# Patient Record
(demographics unavailable — no encounter records)

---

## 2025-02-26 NOTE — END OF VISIT
[] : Resident [FreeTextEntry3] : Here to re-establish care after 4 years. His ACT team manages his medications.  theoretically confirmed meds, but lithium 20 is an unusual dose. On some amount of lithium/olanzapine bid and gets injectable risperidone. Active tobacco user and still smokes 1 ppd x 31 p-y. Will get lung CT at age 50. Bronchial breath sounds and occasional cough, will refer for spirometry. Derm or basic topical if acne worsening. flu today, hep B series later. FIT next year.

## 2025-02-26 NOTE — HEALTH RISK ASSESSMENT
[Little interest or pleasure doing things] : 1) Little interest or pleasure doing things [Feeling down, depressed, or hopeless] : 2) Feeling down, depressed, or hopeless [0] : 2) Feeling down, depressed, or hopeless: Not at all (0) [No] : In the past 12 months have you used drugs other than those required for medical reasons? No [PHQ-2 Negative - No further assessment needed] : PHQ-2 Negative - No further assessment needed [Current] : Current [20 or more] : 20 or more [Unemployed] : unemployed [Single] : single [Fair] :  ~his/her~ mood as fair [Change in mental status noted] : Change in mental status noted [Behavior] : difficulty with behavior [Learning/Retaining New Information] : difficulty learning/retaining new information [Handling Complex Tasks] : difficulty handling complex tasks [Reasoning] : difficulty with reasoning [Behavioral] : behavioral [Health Literacy] : health literacy [Alone] : lives alone [Feels Safe at Home] : Feels safe at home [Reports changes in dental health] : Reports changes in dental health [FreeTextEntry1] : dying from smoking. [de-identified] : but previous use in chart [[id number]] : 0 [Sexually Active] : not sexually active [High Risk Behavior] : no high risk behavior

## 2025-02-26 NOTE — PHYSICAL EXAM
[Well Developed] : well developed [Normal Outer Ear/Nose] : the outer ears and nose were normal in appearance [Normal TMs] : both tympanic membranes were normal [Normal Nasal Mucosa] : the nasal mucosa was normal [No Respiratory Distress] : no respiratory distress  [No Accessory Muscle Use] : no accessory muscle use [Acne] : acne [Normal] : normal gait, coordination grossly intact, no focal deficits and deep tendon reflexes were 2+ and symmetric [Person] : oriented to person [Short Term Intact] : short term memory intact [Span Intact] : the attention span was normal [Naming Objects] : no difficulty naming common objects [Flat] : flat [Impaired Insight] : impaired insight [Rate Slowed] : slowed [Slowed Rate Of Thought] : slowed rate of thought [Tangentiality] : tangentiality [Delusions] : exhibited no delusions [Hallucinations] : exhibited no hallucinations [Obsessions] : denied obsessions [Preoccupation with Violence] : denied preoccupation with violent thoughts [Suicidal Ideation] : denied suicidal ideation [Suicidal Intent] : denied suicidal intent [Suicidal Plan] : denied suicidal plans [Homicidal Ideation] : denied homicidal ideation [Homicidal Intent] : denied homicidal intention [Homicidal Plan] : denied homicidal plans [de-identified] : some yellowing of molars, no signs of swelling [de-identified] : bronchial breath sounds on R middle and lower lobes

## 2025-02-26 NOTE — REVIEW OF SYSTEMS
[Cough] : cough [Negative] : Musculoskeletal [Headache] : no headache [Suicidal] : not suicidal [Insomnia] : no insomnia [Anxiety] : no anxiety [Depression] : no depression [FreeTextEntry6] : daily cough [FreeTextEntry7] : GERD per Noe, CM [de-identified] : acne on face and upper back

## 2025-02-26 NOTE — HEALTH RISK ASSESSMENT
[Little interest or pleasure doing things] : 1) Little interest or pleasure doing things [Feeling down, depressed, or hopeless] : 2) Feeling down, depressed, or hopeless [0] : 2) Feeling down, depressed, or hopeless: Not at all (0) [No] : In the past 12 months have you used drugs other than those required for medical reasons? No [PHQ-2 Negative - No further assessment needed] : PHQ-2 Negative - No further assessment needed [Current] : Current [20 or more] : 20 or more [Unemployed] : unemployed [Single] : single [Fair] :  ~his/her~ mood as fair [Change in mental status noted] : Change in mental status noted [Behavior] : difficulty with behavior [Learning/Retaining New Information] : difficulty learning/retaining new information [Handling Complex Tasks] : difficulty handling complex tasks [Reasoning] : difficulty with reasoning [Behavioral] : behavioral [Health Literacy] : health literacy [Alone] : lives alone [Feels Safe at Home] : Feels safe at home [Reports changes in dental health] : Reports changes in dental health [FreeTextEntry1] : dying from smoking. [de-identified] : but previous use in chart [[id number]] : 0 [Sexually Active] : not sexually active [High Risk Behavior] : no high risk behavior

## 2025-02-26 NOTE — CURRENT MEDS
[Takes medication as prescribed] : takes [FreeTextEntry1] : medications given to him at residence, takes them daily.

## 2025-02-26 NOTE — HISTORY OF PRESENT ILLNESS
[FreeTextEntry1] : here for CPE [de-identified] : Patient Josafat Garibay is a 43yo M w/ PMH schizophrenia, depression, anxiety, active smoker, prior h/o polysubstance abuse (alcohol, marijuana), here for CPE, previously seen by our office in .   Patient is doing well, he denies any recent hospitalizations. He initially thought he needed a referral from our office for a dentist appointment but was directed to book this via the care coordination he receives from his ACT team. He was previously listed on our records to be on lithium 20mg BID and olanzapine 20mg BID and long-acting Risperidone 150mg qmonthly (previously Haldol 10mg on our records). A lady by the name of Shaila (?) gives him his medications qAM and QHS and checks in on him. He lives in the [facility] ([address] [place] [phone]), and is followed by the  and ACT team.   Patient otherwise does well, endorses ongoing smoking, since he was 14 yo, 1 pack a day, of cigarettes (31 pack year total). Has a daytime cough that is not bothersome to him, but had no recent URTI infections, no difficulties breathing, no history of hospitalizations for COPDe etc. He has previously tried to quit in the past, via nicotine gum, he went to a smoking cessation program in the [place], and has tried quitting cold, but these have not worked for him so he is uninterested in quitting. He denies any constiutional sx, has good appetite. Denies any other substances like alcohol, marijuana, although he used to do this. Has ongoing facial and upper back acne that is sometimes bothersome to him. No issues with mobility. No issues with bowel and bladder, has no LUTS and regular bowel movements qdaily. Past surgical history of a repair ?biceps tendon on the medial aspect of his left arm, medial to the olecranon which he had at [facility], leading to flexion of his PIP on the L hand, but this is not bothersome to him, does not impair with daily activities and no sensation differences. Denies vision changes, headache, other FNDs.   Called [facility] residence,  is Noe, number [phone] (number updated in chart). Takes Lithium 20mg BID, risperidone 150mg IM once a month, olanzapine 20mg BID, trazadone 50mg at bedtime, pantoprazole 40mg daily, a multivitamin.  family hx:  father had 2 MI,  of second MI at 65yo, mom is still alive lives by herself in the [place], had an MI, mom is 71yo. His maternal uncle had mental illness, paternal uncle had "heart issues" He denies any siblings, does not have any children.   social hx: patient unemployed, lives in supportive housing residence ([facility] [address]) ,and has a  and ACT team that manages his psychiatric medications, is unable to list his psychiatrist, he thought his CM was someone called shaila but this was later verified by the residence as someone named Noe. He denies any partners, has no recent sexual history, denies travel. He does visit his mother in her apartment and has some friends in the residence.    allergies - had full-body hives when he took penicilin as  a child, 6 yo.

## 2025-02-26 NOTE — HISTORY OF PRESENT ILLNESS
[FreeTextEntry1] : here for CPE [de-identified] : Patient Josafat Garibay is a 45yo M w/ PMH schizophrenia, depression, anxiety, active smoker, prior h/o polysubstance abuse (alcohol, marijuana), here for CPE, previously seen by our office in .   Patient is doing well, he denies any recent hospitalizations. He initially thought he needed a referral from our office for a dentist appointment but was directed to book this via the care coordination he receives from his ACT team. He was previously listed on our records to be on lithium 20mg BID and olanzapine 20mg BID and long-acting Risperidone 150mg qmonthly (previously Haldol 10mg on our records). A lady by the name of Shaila (?) gives him his medications qAM and QHS and checks in on him. He lives in the [facility] ([address] [place] [phone]), and is followed by the  and ACT team.   Patient otherwise does well, endorses ongoing smoking, since he was 14 yo, 1 pack a day, of cigarettes (31 pack year total). Has a daytime cough that is not bothersome to him, but had no recent URTI infections, no difficulties breathing, no history of hospitalizations for COPDe etc. He has previously tried to quit in the past, via nicotine gum, he went to a smoking cessation program in the [place], and has tried quitting cold, but these have not worked for him so he is uninterested in quitting. He denies any constiutional sx, has good appetite. Denies any other substances like alcohol, marijuana, although he used to do this. Has ongoing facial and upper back acne that is sometimes bothersome to him. No issues with mobility. No issues with bowel and bladder, has no LUTS and regular bowel movements qdaily. Past surgical history of a repair ?biceps tendon on the medial aspect of his left arm, medial to the olecranon which he had at [facility], leading to flexion of his PIP on the L hand, but this is not bothersome to him, does not impair with daily activities and no sensation differences. Denies vision changes, headache, other FNDs.   Called [facility] residence,  is Noe, number [phone] (number updated in chart). Takes Lithium 20mg BID, risperidone 150mg IM once a month, olanzapine 20mg BID, trazadone 50mg at bedtime, pantoprazole 40mg daily, a multivitamin.  family hx:  father had 2 MI,  of second MI at 67yo, mom is still alive lives by herself in the [place], had an MI, mom is 71yo. His maternal uncle had mental illness, paternal uncle had "heart issues" He denies any siblings, does not have any children.   social hx: patient unemployed, lives in supportive housing residence ([facility] [address]) ,and has a  and ACT team that manages his psychiatric medications, is unable to list his psychiatrist, he thought his CM was someone called shaila but this was later verified by the residence as someone named Noe. He denies any partners, has no recent sexual history, denies travel. He does visit his mother in her apartment and has some friends in the residence.    allergies - had full-body hives when he took penicilin as  a child, 8 yo.

## 2025-02-26 NOTE — REVIEW OF SYSTEMS
[Cough] : cough [Negative] : Musculoskeletal [Headache] : no headache [Suicidal] : not suicidal [Insomnia] : no insomnia [Anxiety] : no anxiety [Depression] : no depression [FreeTextEntry6] : daily cough [FreeTextEntry7] : GERD per Noe, CM [de-identified] : acne on face and upper back

## 2025-02-26 NOTE — PHYSICAL EXAM
[Well Developed] : well developed [Normal Outer Ear/Nose] : the outer ears and nose were normal in appearance [Normal TMs] : both tympanic membranes were normal [Normal Nasal Mucosa] : the nasal mucosa was normal [No Respiratory Distress] : no respiratory distress  [No Accessory Muscle Use] : no accessory muscle use [Acne] : acne [Normal] : normal gait, coordination grossly intact, no focal deficits and deep tendon reflexes were 2+ and symmetric [Person] : oriented to person [Short Term Intact] : short term memory intact [Span Intact] : the attention span was normal [Naming Objects] : no difficulty naming common objects [Flat] : flat [Impaired Insight] : impaired insight [Rate Slowed] : slowed [Slowed Rate Of Thought] : slowed rate of thought [Tangentiality] : tangentiality [Delusions] : exhibited no delusions [Hallucinations] : exhibited no hallucinations [Obsessions] : denied obsessions [Preoccupation with Violence] : denied preoccupation with violent thoughts [Suicidal Ideation] : denied suicidal ideation [Suicidal Intent] : denied suicidal intent [Suicidal Plan] : denied suicidal plans [Homicidal Ideation] : denied homicidal ideation [Homicidal Intent] : denied homicidal intention [Homicidal Plan] : denied homicidal plans [de-identified] : some yellowing of molars, no signs of swelling [de-identified] : bronchial breath sounds on R middle and lower lobes

## 2025-03-12 NOTE — SOCIAL HISTORY
[TextEntry] : Unemployed, lives in supportive housing residence, and has a  and ACT team that manages his psychiatric medications. He denies any partners, has no recent sexual history, denies travel.

## 2025-03-12 NOTE — HISTORY OF PRESENT ILLNESS
[Current] : current [TextBox_4] : 43yo M w/ PMH schizophrenia, depression, anxiety, active smoker, prior h/o polysubstance abuse (alcohol, marijuana), here for initial visit for cough.   [facility] residence,  is No[place], number [phone]. [facility], ACT care team Mara OKEEFE  - [phone]  - [phone]  Takes Lithium 300mg BID, risperidone 150mg IM once a month, olanzapine 20mg BID. He has previously tried to quit in the past, via nicotine gum, he went to a smoking cessation program in the  [place] a couple years ago, and has tried quitting cold, but these have not worked for him so he is uninterested in quitting. ongoing smoking, since he was 14 yo, 1 pack a day, of cigarettes (31 pack year total). Has a daytime cough that is not bothersome to him, but had no recent URTI infections, no difficulties breathing, no history of hospitalizations for COPD etc.   Patient started smoking at 13 years old, would like to quit smoking, worried about consequences of smoking for his health  [TextBox_11] : 1 [TextBox_13] : 31

## 2025-03-12 NOTE — RESULTS/DATA
[TextEntry] : RADIOLOGY     PFT 3/12/25 FVC 3.98L, 76%p FEV1 3.03L, 74%p FEV1/ T.17L, 87%p DLCO: 19.25units, 60% p (underestimated) Impression: no obstruction or restriction, PRISM pattern, mildly reduced (underestimated) DLCO   EKG / ECHO     MICRO      BACTERIAL:       MYCOBACTERIAL:           FUNGAL:     PATH     OTHER LABS OF NOTE

## 2025-03-12 NOTE — FAMILY HISTORY
[TextEntry] : Family hx: father had 2 MI,  of second MI at 67yo, mom is still alive lives by herself in the [place], had an MI, mom is 71yo. His maternal uncle had mental illness, paternal uncle had "heart issues" He denies any siblings, does not have any children.

## 2025-03-12 NOTE — HISTORY OF PRESENT ILLNESS
[Current] : current [TextBox_4] : 43yo M w/ PMH schizophrenia, depression, anxiety, active smoker, prior h/o polysubstance abuse (alcohol, marijuana), here for initial visit for cough.   [facility] residence,  is Noe, number [phone]. [facility] the unders[facility], ACT care team Mara OKEEFE  - [phone]  - [phone]  Takes Lithium 300mg BID, risperidone 150mg IM once a month, olanzapine 20mg BID. He has previously tried to quit in the past, via nicotine gum, he went to a smoking cessation program in the  [place] a couple years ago, and has tried quitting cold, but these have not worked for him so he is uninterested in quitting. ongoing smoking, since he was 12 yo, 1 pack a day, of cigarettes (31 pack year total). Has a daytime cough that is not bothersome to him, but had no recent URTI infections, no difficulties breathing, no history of hospitalizations for COPD etc.   Patient started smoking at 13 years old, would like to quit smoking, worried about consequences of smoking for his health  [TextBox_11] : 1 [TextBox_13] : 31

## 2025-03-12 NOTE — ASSESSMENT
[FreeTextEntry1] : 43yo M w/ PMH schizophrenia, depression, anxiety, active smoker, prior h/o polysubstance abuse (alcohol, marijuana), here for initial visit for treatment of tobacco abuse  #Current smoker  - Previously (years ago) went to smoking cessation program in the [place] - Was not able to quit with nicotine replacement therapy - Wants to quit and is concerned about health effects of smoking but discouraged by previously unsuccessful attempts - Smoking cessation discussed: -- encouraged continued cessation attempts -- will reach out to  Noe and pt's psychiatrist to discuss whether we can try Chantix in addition to NRT. The most common side effects of chantix reporated are nausea and disordered sleep including insomina and unusually vivid dreams. Despite prior concerns about neuropsychiatric effects of Chantix, subsequent data indicates that " varenicline does not cause an excess of neuropsychiatric side effects compared with nicotine replacement or bupropion" (UpToDate). Furthermore, a "double-blind trial, Evaluating Adverse Events in a Global Smoking Cessation Study (EAGLES), enrolled approximately 8000 individuals who were motivated to quit smoking, half of whom had stable psychiatric disorders (eg, major depressive, bipolar, or anxiety disorders) [11]. Although patients with a psychiatric comorbidity had higher rates of neuropsychiatric symptoms than patients without this comorbidity, rates were low for both groups, and there was no difference between those treated with NRT, bupropion, varenicline, or placebo. Based on these data, the FDA removed the boxed warning for varenicline (and bupropion) in December 2016". (UpToDate) -- might benefit from re-enrolling in smoking cessation program   F/u 1 year with PFT

## 2025-03-12 NOTE — FAMILY HISTORY
[TextEntry] : Family hx: father had 2 MI,  of second MI at 65yo, mom is still alive lives by herself in the [place], had an MI, mom is 69yo. His maternal uncle had mental illness, paternal uncle had "heart issues" He denies any siblings, does not have any children.

## 2025-03-12 NOTE — ASSESSMENT
[FreeTextEntry1] : 45yo M w/ PMH schizophrenia, depression, anxiety, active smoker, prior h/o polysubstance abuse (alcohol, marijuana), here for initial visit for treatment of tobacco abuse  #Current smoker  - Previously (years ago) went to smoking cessation program in the [place] - Was not able to quit with nicotine replacement therapy - Wants to quit and is concerned about health effects of smoking but discouraged by previously unsuccessful attempts - Smoking cessation discussed: -- encouraged continued cessation attempts -- will reach out to  Noe and pt's psychiatrist to discuss whether we can try Chantix in addition to NRT. The most common side effects of chantix reporated are nausea and disordered sleep including insomina and unusually vivid dreams. Despite prior concerns about neuropsychiatric effects of Chantix, subsequent data indicates that " varenicline does not cause an excess of neuropsychiatric side effects compared with nicotine replacement or bupropion" (UpToDate). Furthermore, a "double-blind trial, Evaluating Adverse Events in a Global Smoking Cessation Study (EAGLES), enrolled approximately 8000 individuals who were motivated to quit smoking, half of whom had stable psychiatric disorders (eg, major depressive, bipolar, or anxiety disorders) [11]. Although patients with a psychiatric comorbidity had higher rates of neuropsychiatric symptoms than patients without this comorbidity, rates were low for both groups, and there was no difference between those treated with NRT, bupropion, varenicline, or placebo. Based on these data, the FDA removed the boxed warning for varenicline (and bupropion) in December 2016". (UpToDate) -- might benefit from re-enrolling in smoking cessation program   F/u 1 year with PFT

## 2025-04-09 NOTE — HISTORY OF PRESENT ILLNESS
[Current] : current [TextBox_4] : 43yo M w/ PMH schizophrenia, depression, anxiety, active smoker, prior h/o polysubstance abuse (alcohol, marijuana), here for initial visit for cough.   [facility] residence,  is No[place], number [phone]. Serivices for the underserved, ACT care team Mara OKEEFE  - [phone]  - [phone]  Takes Lithium 300mg BID, risperidone 150mg IM once a month, olanzapine 20mg BID. He has previously tried to quit in the past, via nicotine gum, he went to a smoking cessation program in the  [place] a couple years ago, and has tried quitting cold, but these have not worked for him so he is uninterested in quitting. ongoing smoking, since he was 12 yo, 1 pack a day, of cigarettes (31 pack year total). Has a daytime cough that is not bothersome to him, but had no recent URTI infections, no difficulties breathing, no history of hospitalizations for COPD etc.   Patient started smoking at 13 years old, would like to quit smoking, worried about consequences of smoking for his health  [TextBox_11] : 1 [TextBox_13] : 31

## 2025-04-09 NOTE — FAMILY HISTORY
[TextEntry] : Family hx: father had 2 MI,  of second MI at 65yo, mom is still alive lives by herself in the [place], had an MI, mom is 71yo. His maternal uncle had mental illness, paternal uncle had "heart issues" He denies any siblings, does not have any children.

## 2025-04-09 NOTE — ASSESSMENT
[FreeTextEntry1] : 45yo M w/ PMH schizophrenia, depression, anxiety, active smoker, prior h/o polysubstance abuse (alcohol, marijuana), here for initial visit for treatment of tobacco abuse  #Current smoker  - Previously (years ago) went to smoking cessation program in the [place] - Was not able to quit with nicotine replacement therapy - Wants to quit and is concerned about health effects of smoking but discouraged by previously unsuccessful attempts - Smoking cessation discussed: -- encouraged continued cessation attempts -- will reach out to  Noe and pt's psychiatrist to discuss whether we can try Chantix in addition to NRT. The most common side effects of chantix reporated are nausea and disordered sleep including insomina and unusually vivid dreams. Despite prior concerns about neuropsychiatric effects of Chantix, subsequent data indicates that " varenicline does not cause an excess of neuropsychiatric side effects compared with nicotine replacement or bupropion" (UpToDate). Furthermore, a "double-blind trial, Evaluating Adverse Events in a Global Smoking Cessation Study (EAGLES), enrolled approximately 8000 individuals who were motivated to quit smoking, half of whom had stable psychiatric disorders (eg, major depressive, bipolar, or anxiety disorders) [11]. Although patients with a psychiatric comorbidity had higher rates of neuropsychiatric symptoms than patients without this comorbidity, rates were low for both groups, and there was no difference between those treated with NRT, bupropion, varenicline, or placebo. Based on these data, the FDA removed the boxed warning for varenicline (and bupropion) in December 2016". (UpToDate) -- might benefit from re-enrolling in smoking cessation program   F/u 1 year with PFT